# Patient Record
Sex: FEMALE | Race: ASIAN | Employment: UNEMPLOYED | URBAN - METROPOLITAN AREA
[De-identification: names, ages, dates, MRNs, and addresses within clinical notes are randomized per-mention and may not be internally consistent; named-entity substitution may affect disease eponyms.]

---

## 2017-08-31 ENCOUNTER — HOSPITAL ENCOUNTER (OUTPATIENT)
Dept: FAMILY PLANNING/WOMEN'S HEALTH CLINIC | Age: 32
Discharge: HOME | End: 2017-08-31
Payer: COMMERCIAL

## 2017-08-31 PROCEDURE — 99201 HC NEW PT LEVEL I: CPT

## 2017-08-31 NOTE — DISCHARGE INSTRUCTIONS
Breastfeeding instructions    · Follow up: with Dr. Isabella Whitney Friday 9/01 as scheduled. · Eat at least 3 well-balanced meals per day with snacks and drink to thirst.  · Sleep when baby sleeps  · Watch for babys natural feeding cues: e.g. sucking on fist, rooting. Dont allow baby to become too hungry. Skin to skin contact with baby as much as possible during day. · Attempt to feed baby every 2-3 hours for minimum of 8 times per day, maximum 12 times per day. Special Instructions:                             Keep baby awake for feeding, aim for 15 to 20 minutes each side. · Nurse baby on first breast until he/she no longer swallows, even with breast compression, then offer second breast.  · Wake baby every 2 to 3  hours at night. Alert baby by removing babys clothing, sitting upright, changing diaper, cord care, rubbing feet or back. Keep lighting low and avoid overstimulating background noise  · Keep written record of feeding times and babys urine and stool output. · Call pediatrician if baby:  1. Has less than 6 wet / 3-4 stools in 24 hours (newborns over 6 days)  2. Has concentrated, strong smelling urine  3. Is lethargic or not feeding  4. Has forceful spitting  5. Is extremely fussy or colicky and cannot be calmed  6. Is jaundiced / color is yellow  7. Is running a fever  8. Blood or mucous in the stool  9. Any concerns     Call [facility] to schedule any needed follow up or with any questions or concerns.

## 2017-08-31 NOTE — PROGRESS NOTES
[address]  [address]  [address] [place] [address], [id number] [facility] with [facility] [id number]  Dept: [phone]    LACTATION REPORT TO HEALTHCARE PROVIDER    Date: 2017    Dear Molly Amin MD: Madhav Jauregui: This is to inform you that I have seen    Baby name (First Name, Last Name): Greig Barthel                                  Mother: Roselia Nielsen    Reason for Visit: Other (Comment) (to see how much infant is getting at breast)    Baby date of birth: 17     Baby's Gestational age: 44.1  Birth Weight (Born Outside of [place]): 3.629 kg (8 lb)     Discharge weight: 3.229 kg (7 lb 1.9 oz)     Date                  Weight        Recorded Weight Date 1: 17  Recorded Weight 1: 3.232 kg (7 lb 2 oz)        Recorded  Weight Date 2: 17  Recorded Weight 2: 3.311 kg (7 lb 4.8 oz) (naked)    Pre-feed Weight: 3.328 kg (7 lb 5.4 oz)  Post-feed Weight Left Breast: 3.379 kg (7 lb 7.2 oz)  Post-feed Weight Right Breast: 3.337 kg (7 lb 5.7 oz)     Total amount of milk transferred: Total Weight Gain: 1.8 oz    Total time of feedin minutes       Amount of milk/formula supplemented: 0    Breast Assessment:  Left Breast: Medium  Left Nipple: Large;Everted; Intact  Right Breast: Medium  Right Nipple: Large;Everted; Intact    Oral assessement:  (labial lip tie noted)    Enclosed please find a copy of the instructions given to the patient. Mother in for consult due to initial weight loss after birth. She reports that at first baby was not latching, but now she is and she is exclusively BF. Came in per suggestion from Pediatrician to see what infant is taking in at breast. On assessment, baby noted to have gained 3.4 ounces in two days based on dry diaper pre-feed weight.      Observed feeding, mother using good positioning and latch techniques and is assisted by Grandmother who is very involved in helping mother feed baby; helping to place pillows, to keep baby awake for feeding when she becomes sleepy, etc. Mother's nipples are sanaz and wide, but baby able to latch well and mother denies nipple pain or discomfort. Mother encouraged to continue frequent exclusive BF, keeping baby awake for feeding if needed and waking every 2 to 3 hours if she is not waking on own. Has follow up with Dr. Myrna Amaya tomorrow, and encouraged [facility]. Please feel free to contact me at [facility] with any questions or concerns.     Thank you,    Pritesh Dealtorre RN IBCLC